# Patient Record
Sex: FEMALE | Race: WHITE | NOT HISPANIC OR LATINO | Employment: FULL TIME | URBAN - METROPOLITAN AREA
[De-identification: names, ages, dates, MRNs, and addresses within clinical notes are randomized per-mention and may not be internally consistent; named-entity substitution may affect disease eponyms.]

---

## 2022-01-01 ENCOUNTER — APPOINTMENT (OUTPATIENT)
Dept: GENERAL RADIOLOGY | Facility: HOSPITAL | Age: 77
End: 2022-01-01

## 2022-01-01 ENCOUNTER — HOSPITAL ENCOUNTER (EMERGENCY)
Facility: HOSPITAL | Age: 77
Discharge: HOME | End: 2022-01-01
Attending: EMERGENCY MEDICINE | Admitting: EMERGENCY MEDICINE

## 2022-01-01 VITALS
DIASTOLIC BLOOD PRESSURE: 75 MMHG | HEART RATE: 72 BPM | BODY MASS INDEX: 27.34 KG/M2 | RESPIRATION RATE: 17 BRPM | OXYGEN SATURATION: 98 % | TEMPERATURE: 98.4 F | HEIGHT: 63 IN | SYSTOLIC BLOOD PRESSURE: 155 MMHG | WEIGHT: 154.32 LBS

## 2022-01-01 DIAGNOSIS — J18.9 PNEUMONIA OF BOTH LOWER LOBES DUE TO INFECTIOUS ORGANISM: Primary | ICD-10-CM

## 2022-01-01 DIAGNOSIS — Z20.822 SUSPECTED COVID-19 VIRUS INFECTION: ICD-10-CM

## 2022-01-01 LAB — SARS-COV-2 RNA PNL SPEC NAA+PROBE: DETECTED

## 2022-01-01 PROCEDURE — 71045 X-RAY EXAM CHEST 1 VIEW: CPT

## 2022-01-01 PROCEDURE — 99283 EMERGENCY DEPT VISIT LOW MDM: CPT

## 2022-01-01 PROCEDURE — U0004 COV-19 TEST NON-CDC HGH THRU: HCPCS | Performed by: EMERGENCY MEDICINE

## 2022-01-01 RX ORDER — ONDANSETRON 4 MG/1
4 TABLET, ORALLY DISINTEGRATING ORAL EVERY 6 HOURS PRN
Qty: 20 TABLET | Refills: 0 | Status: SHIPPED | OUTPATIENT
Start: 2022-01-01

## 2022-01-01 RX ORDER — AZITHROMYCIN 250 MG/1
TABLET, FILM COATED ORAL
Qty: 6 TABLET | Refills: 0 | Status: SHIPPED | OUTPATIENT
Start: 2022-01-01

## 2022-01-01 RX ORDER — IPRATROPIUM BROMIDE AND ALBUTEROL SULFATE 2.5; .5 MG/3ML; MG/3ML
3 SOLUTION RESPIRATORY (INHALATION) EVERY 4 HOURS PRN
Qty: 360 ML | Refills: 0 | Status: SHIPPED | OUTPATIENT
Start: 2022-01-01

## 2022-01-01 RX ORDER — BENZONATATE 200 MG/1
200 CAPSULE ORAL 3 TIMES DAILY PRN
Qty: 20 CAPSULE | Refills: 0 | Status: SHIPPED | OUTPATIENT
Start: 2022-01-01

## 2022-01-01 RX ORDER — DEXAMETHASONE 4 MG/1
4 TABLET ORAL 2 TIMES DAILY WITH MEALS
Qty: 10 TABLET | Refills: 0 | Status: SHIPPED | OUTPATIENT
Start: 2022-01-01 | End: 2022-01-06

## 2022-01-01 NOTE — PROGRESS NOTES
Called patient, verified name and date of birth, positive COVID results provided, discussed CDC guidelines, the Health Department will be contacting her with further instructions, patient verbalized understanding.

## 2022-01-01 NOTE — DISCHARGE INSTRUCTIONS
Drink plenty of fluids, rest, take OTC tylenol/ibuprofen as needed for headache/body aches/fever. Take your prescribed medications as directed, use the incentive spirometer every hour while awake. Return to the ED for chest pain, shortness of breath or any other symptoms of concern.

## 2022-01-01 NOTE — ED PROVIDER NOTES
Subjective   Pt states she woke up earlier this morning with pain in her upper back and a cough with yellowish sputum. No fever, chills, chest pain, shortness of breath. She is awake and alert, non toxic appearing and her vitals are stable on exam.       History provided by:  Patient   used: No        Review of Systems   Constitutional: Negative.    HENT: Negative.    Eyes: Negative.    Respiratory: Positive for cough.    Cardiovascular: Negative.    Gastrointestinal: Negative.    Endocrine: Negative.    Genitourinary: Negative.    Musculoskeletal: Positive for back pain.   Skin: Negative.    Allergic/Immunologic: Negative.    Neurological: Negative.    Hematological: Negative.    Psychiatric/Behavioral: Negative.        Past Medical History:   Diagnosis Date   • COPD (chronic obstructive pulmonary disease) (HCC)    • Hyperlipidemia    • Hypertension        Allergies   Allergen Reactions   • Ciprofloxacin Anaphylaxis   • Codeine Anaphylaxis   • Gabapentin Anaphylaxis   • Morphine Anaphylaxis       Past Surgical History:   Procedure Laterality Date   • HYSTERECTOMY     • REPLACEMENT TOTAL KNEE BILATERAL Bilateral        History reviewed. No pertinent family history.    Social History     Socioeconomic History   • Marital status:    Tobacco Use   • Smoking status: Never Smoker   • Smokeless tobacco: Never Used   Vaping Use   • Vaping Use: Never used   Substance and Sexual Activity   • Alcohol use: Never   • Drug use: Never   • Sexual activity: Defer           Objective   Physical Exam  Vitals and nursing note reviewed.   Constitutional:       Appearance: Normal appearance. She is normal weight.   HENT:      Head: Normocephalic and atraumatic.      Nose: Nose normal.      Mouth/Throat:      Mouth: Mucous membranes are moist.   Eyes:      Pupils: Pupils are equal, round, and reactive to light.   Cardiovascular:      Rate and Rhythm: Normal rate and regular rhythm.      Pulses: Normal pulses.       Heart sounds: Normal heart sounds.   Pulmonary:      Effort: Pulmonary effort is normal. No tachypnea, bradypnea or accessory muscle usage.      Breath sounds: Examination of the right-lower field reveals decreased breath sounds. Examination of the left-lower field reveals decreased breath sounds. Decreased breath sounds present.       Abdominal:      General: Abdomen is flat. Bowel sounds are normal.      Palpations: Abdomen is soft.   Musculoskeletal:         General: Normal range of motion.      Cervical back: Normal range of motion.   Skin:     General: Skin is warm and dry.      Capillary Refill: Capillary refill takes less than 2 seconds.   Neurological:      General: No focal deficit present.      Mental Status: She is alert and oriented to person, place, and time. Mental status is at baseline.   Psychiatric:         Mood and Affect: Mood normal.         Procedures           ED Course                                               PSI/PORT Score (for pneumonia severity - CAP) reviewed and/or performed as part of the patient evaluation and treatment planning process.  The result associated with this review/performance is: 65       MDM  Number of Diagnoses or Management Options  Pneumonia of both lower lobes due to infectious organism: new and requires workup  Suspected COVID-19 virus infection: new and requires workup  Diagnosis management comments: The patient is well-appearing and in no respiratory distress.  The patient was tested for COVID-19 in the emergency department and is currently awaiting results.  The patient has a normal mental status and is neurologically intact. The patient appears well and is able to tolerate food for fluid by mouth and there's no significant dehydration. There is no respiratory distress and no signs of systemic toxicity. The history, exam, diagnostic testing and current condition do not demonstrate an infectious process such as meningitis, retropharyngeal abscess,  epiglottitis, sepsis or other serious bacterial infection requiring further testing, treatment, consultation, or admission at this time. The patient has no additional oxygen requirement. The patient has a chest x-ray that is suggestive of pneumonia. The patient has a PORT score that is suitable for outpatient treatment.  The patient has no respiratory distress, or hypoxia.  The vital signs have been stable. The patient's condition is stable and appropriate for discharge. The possible party was advised to return for worsening fever, respiratory distress, intractable vomiting, weakness, shortness of breath, chest pain, or altered mental status. The responsible party will pursue further outpatient evaluation with the primary care physician. The responsible party has expressed a clear and thorough understanding and agreed to follow-up as instructed.         Amount and/or Complexity of Data Reviewed  Clinical lab tests: reviewed and ordered  Tests in the radiology section of CPT®: reviewed and ordered    Risk of Complications, Morbidity, and/or Mortality  Presenting problems: low  Diagnostic procedures: low  Management options: low    Patient Progress  Patient progress: stable      Final diagnoses:   Pneumonia of both lower lobes due to infectious organism   Suspected COVID-19 virus infection       ED Disposition  ED Disposition     ED Disposition Condition Comment    Discharge Stable           Conrado Figueroa MD  [address] [facility] [address] [phone]      If symptoms worsen         Medication List      New Prescriptions    azithromycin 250 MG tablet  Commonly known as: ZITHROMAX  Take 2 tablets today, then take 1 tablet daily for 4 days     benzonatate 200 MG capsule  Commonly known as: TESSALON  Take 1 capsule by mouth 3 (Three) Times a Day As Needed for Cough.     dexamethasone 4 MG tablet  Commonly known as: DECADRON  Take 1 tablet by mouth 2 (Two) Times a Day With Meals for 5 days.      ipratropium-albuterol 0.5-2.5 mg/3 ml nebulizer  Commonly known as: DUO-NEB  Take 3 mL by nebulization Every 4 (Four) Hours As Needed for Wheezing.     ondansetron ODT 4 MG disintegrating tablet  Commonly known as: ZOFRAN-ODT  Place 1 tablet on the tongue Every 6 (Six) Hours As Needed for Nausea or Vomiting.           Where to Get Your Medications      [id number] - [place], [place] - [address] AT [facility] - [phone]  - [phone] FX  [address] [place] [address]    Phone: [phone]   · azithromycin 250 MG tablet  · benzonatate 200 MG capsule  · dexamethasone 4 MG tablet  · ipratropium-albuterol 0.5-2.5 mg/3 ml nebulizer  · ondansetron ODT 4 MG disintegrating tablet          Ellen Frazier, APRN  01/1945

## 2024-11-13 ENCOUNTER — OFFICE VISIT (OUTPATIENT)
Dept: ORTHOPEDIC SURGERY | Facility: CLINIC | Age: 79
End: 2024-11-13
Payer: MEDICARE

## 2024-11-13 VITALS
HEART RATE: 56 BPM | DIASTOLIC BLOOD PRESSURE: 68 MMHG | OXYGEN SATURATION: 96 % | WEIGHT: 129 LBS | SYSTOLIC BLOOD PRESSURE: 152 MMHG | BODY MASS INDEX: 22.86 KG/M2 | HEIGHT: 63 IN

## 2024-11-13 DIAGNOSIS — M25.521 RIGHT ELBOW PAIN: Primary | ICD-10-CM

## 2024-11-13 DIAGNOSIS — M25.552 LEFT HIP PAIN: ICD-10-CM

## 2024-11-13 DIAGNOSIS — M70.62 TROCHANTERIC BURSITIS OF LEFT HIP: ICD-10-CM

## 2024-11-13 DIAGNOSIS — M19.021 OSTEOARTHRITIS OF RIGHT ELBOW, UNSPECIFIED OSTEOARTHRITIS TYPE: ICD-10-CM

## 2024-11-13 RX ORDER — GLYCOPYRROLATE AND FORMOTEROL FUMARATE 9; 4.8 UG/1; UG/1
2 AEROSOL, METERED RESPIRATORY (INHALATION) 2 TIMES DAILY
COMMUNITY

## 2024-11-13 RX ORDER — SENNOSIDES 8.6 MG/1
1 TABLET, COATED ORAL DAILY
COMMUNITY

## 2024-11-13 RX ORDER — HYDROCODONE BITARTRATE AND ACETAMINOPHEN 7.5; 325 MG/1; MG/1
1 TABLET ORAL DAILY PRN
COMMUNITY
Start: 2024-11-07

## 2024-11-13 RX ORDER — PLECANATIDE 3 MG/1
1 TABLET ORAL DAILY
COMMUNITY

## 2024-11-13 RX ORDER — AMLODIPINE BESYLATE 10 MG/1
1 TABLET ORAL DAILY
COMMUNITY
Start: 2024-07-25

## 2024-11-13 RX ORDER — FUROSEMIDE 20 MG/1
20 TABLET ORAL DAILY
COMMUNITY

## 2024-11-13 RX ORDER — BENAZEPRIL HYDROCHLORIDE 20 MG/1
1 TABLET ORAL DAILY
COMMUNITY
Start: 2024-08-19

## 2024-11-13 RX ORDER — APIXABAN 5 MG/1
1 TABLET, FILM COATED ORAL 2 TIMES DAILY
COMMUNITY
Start: 2024-08-19

## 2024-11-13 RX ORDER — METOPROLOL SUCCINATE 25 MG/1
0.5 TABLET, EXTENDED RELEASE ORAL DAILY
COMMUNITY

## 2024-11-13 RX ORDER — SPIRONOLACTONE 25 MG/1
1 TABLET ORAL EVERY OTHER DAY
COMMUNITY

## 2024-11-13 RX ORDER — MONTELUKAST SODIUM 10 MG/1
10 TABLET ORAL NIGHTLY
COMMUNITY

## 2024-11-13 RX ORDER — ROSUVASTATIN CALCIUM 10 MG/1
1 TABLET, COATED ORAL
COMMUNITY
Start: 2024-08-19

## 2024-11-13 RX ORDER — ASPIRIN 81 MG/1
81 TABLET, CHEWABLE ORAL DAILY
COMMUNITY
Start: 2024-08-19

## 2024-11-13 RX ORDER — CYANOCOBALAMIN 1000 UG/ML
100 INJECTION, SOLUTION INTRAMUSCULAR; SUBCUTANEOUS
COMMUNITY
Start: 2024-10-18

## 2024-11-13 RX ORDER — IRON PS COMPLEX/B12/FOLIC ACID 150-25-1
1 CAPSULE ORAL DAILY
COMMUNITY
Start: 2024-08-19

## 2024-11-13 RX ORDER — ALPRAZOLAM 0.5 MG
1 TABLET ORAL 2 TIMES DAILY
COMMUNITY
Start: 2024-07-25

## 2024-11-13 RX ORDER — FLUTICASONE PROPIONATE 50 MCG
2 SPRAY, SUSPENSION (ML) NASAL DAILY
COMMUNITY
Start: 2024-10-08

## 2024-11-13 RX ADMIN — TRIAMCINOLONE ACETONIDE 40 MG: 40 INJECTION, SUSPENSION INTRA-ARTICULAR; INTRAMUSCULAR at 13:22

## 2024-11-13 RX ADMIN — LIDOCAINE HYDROCHLORIDE 5 ML: 10 INJECTION, SOLUTION INFILTRATION; PERINEURAL at 13:22

## 2024-11-13 NOTE — PROGRESS NOTES
"Chief Complaint  Initial Evaluation of the Right Elbow and Initial Evaluation of the Left Hip     Subjective      Pepper Hernandes presents to [facility] for initial evaluation of the right elbow and the left hip .She has pain on the lateral aspect of the elbow.  She has decrease ROM.  She has pain on the lateral aspect of the hip.  She has had no recent injury or falls.  She has had injections in the elbow that give little relief.      Allergies   Allergen Reactions    Ciprofloxacin Anaphylaxis    Codeine Anaphylaxis    Gabapentin Anaphylaxis    Ipratropium-Albuterol Shortness Of Breath    Levofloxacin Other (See Comments) and Rash    Morphine Anaphylaxis    Metaxalone Other (See Comments)    Methylprednisolone Other (See Comments)    Scopolamine Other (See Comments)     GI    Propoxyphene Itching        Social History     Socioeconomic History    Marital status:    Tobacco Use    Smoking status: Never    Smokeless tobacco: Never   Vaping Use    Vaping status: Never Used   Substance and Sexual Activity    Alcohol use: Never    Drug use: Never    Sexual activity: Defer        I reviewed the patient's chief complaint, history of present illness, review of systems, past medical history, surgical history, family history, social history, medications, and allergy list.     Review of Systems     Constitutional: Denies fevers, chills, weight loss  Cardiovascular: Denies chest pain, shortness of breath  Skin: Denies rashes, acute skin changes  Neurologic: Denies headache, loss of consciousness        Vital Signs:   /68   Pulse 56   Ht 160 cm (63\")   Wt 58.5 kg (129 lb)   SpO2 96%   BMI 22.85 kg/m²          Physical Exam  General: Alert. No acute distress    Ortho Exam        LEFT HIP Full ROM of the hip.  No skin discoloration, atrophy or swelling. Positive EHL, FHL, GS, and TA. Sensation intact to all 5 nerves of the foot. Negative straight leg raise. Leg lengths appear equal. " Ambulates with Non-antalgic gait Negative Hafsa. Negative Doyle. Good strength to hamstrings, quadriceps, dorsiflexors, and plantar flexors. Knee Extensor Mechanism  intact      RIGHT ELBOW Non-tender lateral epicondyle. Non-tender medial epicondyle. Tender radial head. Sensation to light touch median, radial, ulnar nerve. Positive AIN, PIN, ulnar nerve motor function. Axillary sensation intact. Elbow ROM -5-130. Negative Tinel's at the elbow. no pain with resisted wrist and long finger extension.        Large Joint: L hip joint  Date/Time: 11/13/2024 1:22 PM  Consent given by: patient  Site marked: site marked  Timeout: Immediately prior to procedure a time out was called to verify the correct patient, procedure, equipment, support staff and site/side marked as required   Supporting Documentation  Indications: pain   Procedure Details  Location: hip - L hip joint  Preparation: Patient was prepped and draped in the usual sterile fashion  Needle gauge: 21 G.  Medications administered: 5 mL lidocaine 1 %; 40 mg triamcinolone acetonide 40 MG/ML  Patient tolerance: patient tolerated the procedure well with no immediate complications      This injection documentation was Scribed for Steven Vidal MD by Susy Owusu MA.  11/13/24   13:24 EST       Imaging Results (Most Recent)       Procedure Component Value Units Date/Time    XR Elbow 2 View Right [[id number]] Resulted: 11/13/24 1308     Updated: 11/13/24 1309    XR Hip With or Without Pelvis 2 - 3 View Left [[id number]] Resulted: 11/13/24 1308     Updated: 11/13/24 1309             Result Review :     X-Ray Report:  Left hip X-Ray  Indication: Evaluation of the left hip   AP/Lateral view(s)  Findings: Minimal degenerative changes of the left hip.   Prior studies available for comparison: no     X-Ray Report:  Right elbow X-Ray  Indication: Evaluation of the right elbow   AP/Lateral view(s)  Findings: Severe degenerative changes of the right elbow. Radial capitellar  arthritis.   Prior studies available for comparison: no             Assessment and Plan     Diagnoses and all orders for this visit:    1. Right elbow pain (Primary)  -     XR Elbow 2 View Right    2. Left hip pain  -     XR Hip With or Without Pelvis 2 - 3 View Left    3. Trochanteric bursitis of left hip    4. Osteoarthritis of right elbow, unspecified osteoarthritis type    Other orders  -     Large Joint: L hip joint        Discussed the treatment plan with the patient. I reviewed the X-rays that were obtained today with the patient.     Discussed referral to orthopedist in [place] for joint replacement.     Discussed the risks and benefits of conservative measures. The patient expressed understanding and wished to proceed with a left hip trochanteric bursitis injection.  She tolerated the injection well.     Call or return if worsening symptoms.    Follow Up     6-8 weeks for a right elbow steroid injection vs referral to specialist in [place].       Patient was given instructions and counseling regarding her condition or for health maintenance advice. Please see specific information pulled into the AVS if appropriate.     Scribed for Steven Vidal MD by Vera Lafleur MA.  11/13/24   13:03 EST      I have personally performed the services described in this document as scribed by the above individual and it is both accurate and complete. Steven Vidal MD 11/14/24

## 2024-11-14 RX ORDER — TRIAMCINOLONE ACETONIDE 40 MG/ML
40 INJECTION, SUSPENSION INTRA-ARTICULAR; INTRAMUSCULAR
Status: COMPLETED | OUTPATIENT
Start: 2024-11-13 | End: 2024-11-13

## 2024-11-14 RX ORDER — LIDOCAINE HYDROCHLORIDE 10 MG/ML
5 INJECTION, SOLUTION INFILTRATION; PERINEURAL
Status: COMPLETED | OUTPATIENT
Start: 2024-11-13 | End: 2024-11-13